# Patient Record
(demographics unavailable — no encounter records)

---

## 2025-03-03 NOTE — ASSESSMENT
[FreeTextEntry1] : EKG SR 47 PRWP, NSST no change 5/24 A/P  Ascending aorta dilation (447.71) (I77.810) Benign essential hypertension (401.1) (I10) Hyperlipidemia LDL goal <70 (272.4) (E78.5) Non-rheumatic aortic regurgitation (424.1) (I35.1) 1. Aortic regurgitation, non rheumatic 2. dilated aortic root 3. Abnormal EKG f/u echo (last echo nl LEVF w EKG PRWP identical) AR unchanged 6/24  imaging reports from [place] indicate stable ascending aorta 4.5 cm, unchanged 11/24 continue BP control f/u echo 2025 next visit  ekg unchanged from prior (simultaneous echo then shows normal LV fx ==> no echo evidence for AWMI)   4. Hypertension BP at goal after last visit increased norvasc to 10 24 hr ABPM - BP at goal normal BP w dipper  5. Hyperlipidemia, target LDL < 70 6. statin intolerance  Lipids last visit  now increased to 151 started lipitor 80 recheck then 170 pt now states again not taking atorva as causing memory loss  In view of  above atherosclerotic aortic disease, LDL >> goal, statin intolerance to multiple statisn, will order repatha   HGA1C. 5.4.  6. weight loss has stabilized  refer PCP TSH nl weight stable

## 2025-03-03 NOTE — PHYSICAL EXAM
[Normal] : alert and oriented, normal memory [de-identified] : short soft grade 2 diastolic murmur over RUSB